# Patient Record
Sex: MALE | Race: WHITE
[De-identification: names, ages, dates, MRNs, and addresses within clinical notes are randomized per-mention and may not be internally consistent; named-entity substitution may affect disease eponyms.]

---

## 2017-06-07 ENCOUNTER — HOSPITAL ENCOUNTER (EMERGENCY)
Dept: HOSPITAL 11 - JP.ED | Age: 57
Discharge: HOME | End: 2017-06-07
Payer: COMMERCIAL

## 2017-06-07 VITALS — DIASTOLIC BLOOD PRESSURE: 99 MMHG | SYSTOLIC BLOOD PRESSURE: 168 MMHG

## 2017-06-07 DIAGNOSIS — E66.9: ICD-10-CM

## 2017-06-07 DIAGNOSIS — J18.9: Primary | ICD-10-CM

## 2017-06-07 DIAGNOSIS — Z90.81: ICD-10-CM

## 2017-06-07 DIAGNOSIS — Z87.891: ICD-10-CM

## 2017-06-07 PROCEDURE — 87040 BLOOD CULTURE FOR BACTERIA: CPT

## 2017-06-07 PROCEDURE — 85027 COMPLETE CBC AUTOMATED: CPT

## 2017-06-07 PROCEDURE — 93005 ELECTROCARDIOGRAM TRACING: CPT

## 2017-06-07 PROCEDURE — 71020: CPT

## 2017-06-07 PROCEDURE — 99285 EMERGENCY DEPT VISIT HI MDM: CPT

## 2017-06-07 PROCEDURE — 36415 COLL VENOUS BLD VENIPUNCTURE: CPT

## 2017-06-07 NOTE — EDM.PDOC
ED HPI GENERAL MEDICAL PROBLEM





- General


Chief Complaint: Chest Pain


Stated Complaint: NAUSEA/LIGHTHEADED/SHOULDER PAIN


Time Seen by Provider: 06/07/17 15:50


Source of Information: Reports: Patient, Old Records


History Limitations: Reports: No Limitations





- History of Present Illness


INITIAL COMMENTS - FREE TEXT/NARRATIVE: 





57 yo male with mild posterior chest/back pain today. Had chills this 

afternoon. Not aware of fever. Has no cardiac hx, but thought this might be his 

heart. No rash, cough, HA, nausea, diarrhea, abdominal pain, dysuria, sore 

throat or earache. Here with wife. Nocturia x one. 


Onset: Today


Onset Date: 06/07/17


Duration: Hour(s):, Getting Worse


Location: Reports: Chest, Back


Quality: Reports: Ache, Dull


Severity: Mild


Improves with: Reports: None


Worsens with: Reports: Other (time)


Context: Reports: Other (unknown)


Associated Symptoms: Reports: Fever/Chills, Shortness of Breath (very mild with 

exertion today)


Treatments PTA: Reports: Other (see below) (none)


  ** shoulders/ head


Pain Score (Numeric/FACES): 3





- Related Data


 Allergies











Allergy/AdvReac Type Severity Reaction Status Date / Time


 


No Known Allergies Allergy   Verified 03/01/16 06:50











Home Meds: 


 Home Meds





Azithromycin [IJD: Azithromycin] 250 mg PO QAM #6 tab 06/07/17 [Rx]











Past Medical History


HEENT History: Reports: Impaired Vision


Respiratory History: Reports: None


Genitourinary History: Reports: None


Other Genitourinary History: spleenectomy


Psychiatric History: Reports: None


Endocrine/Metabolic History: Reports: Obesity/BMI 30+


Hematologic History: Reports: None


Immunologic History: Reports: None


Oncologic (Cancer) History: Reports: None





- Infectious Disease History


Infectious Disease History: Reports: Chicken Pox





- Past Surgical History


Cardiovascular Surgical History: Reports: Other (See Below)


Other Cardiovascular Surgeries/Procedures: stress test, echo


GI Surgical History: Reports: Other (See Below)


Other GI Surgeries/Procedures: spleenectomy, liver lac patch


Musculoskeletal Surgical History: Reports: Arthroscopic Knee





Social & Family History





- Tobacco Use


Smoking Status *Q: Former Smoker


Years of Tobacco use: 29


Packs/Tins Daily: 1


Second Hand Smoke Exposure: No





- Alcohol Use


Days Per Week of Alcohol Use: 0





- Recreational Drug Use


Recreational Drug Use: No





ED ROS GENERAL





- Review of Systems


Review Of Systems: See Below


Constitutional: Reports: Chills.  Denies: Fever, Diaphoresis, Decreased Appetite


HEENT: Reports: No Symptoms


Respiratory: Reports: Shortness of Breath (today with exertion, mild)


Cardiovascular: Reports: Chest Pain (more in back)


Endocrine: Reports: No Symptoms


GI/Abdominal: Reports: No Symptoms


: Reports: No Symptoms


Musculoskeletal: Reports: Back Pain


Skin: Reports: No Symptoms


Neurological: Reports: No Symptoms





ED EXAM, GENERAL





- Physical Exam


Exam: See Below


Exam Limited By: No Limitations


General Appearance: Alert, WD/WN, No Apparent Distress


Eye Exam: Bilateral Eye: EOMI, Normal Inspection, PERRL


Ears: Normal External Exam, Normal Canal, Hearing Grossly Normal, Normal TMs


Ear Exam: Bilateral Ear: Auricle Normal, Canal Normal, TM normal


Nose: Normal Inspection, Normal Mucosa, No Blood


Throat/Mouth: Normal Inspection, Normal Lips, Normal Oropharynx, Normal Voice, 

No Airway Compromise


Head: Atraumatic, Normocephalic


Neck: Normal Inspection, Supple, Non-Tender


Respiratory/Chest: No Respiratory Distress, Lungs Clear, Normal Breath Sounds, 

No Accessory Muscle Use


Cardiovascular: Regular Rate, Rhythm, No Edema


GI/Abdominal: Normal Bowel Sounds, Soft, Non-Tender, No Distention, Other (

surgical scar from prior splenectomy).  No: Distended, Guarding, Rigid, Rebound

, Tender, Hernia, Mass


Back Exam: Normal Inspection, Full Range of Motion.  No: CVA Tenderness (R), 

CVA Tenderness (L), Decreased Range of Motion, Muscle Spasm, Paraspinal 

Tenderness, Vertebral Tenderness


Extremities: Normal Inspection, Normal Range of Motion, Non-Tender, No Pedal 

Edema


Neurological: Alert, Oriented, CN II-XII Intact, Normal Cognition, No Motor/

Sensory Deficits


Psychiatric: Normal Affect, Normal Mood


Skin Exam: Warm, Dry, Intact, Normal Color, No Rash


Lymphatic: No Adenopathy





EKG INTERPRETATION


EKG Date: 06/07/17


Time: 15:50


Rhythm: NSR


Rate (beats/min): 106


Axis: normal


P-wave: present


QRS: normal


ST-T: normal


QT: normal


Comparison: NA - no prior EKG





Course





- Vital Signs


Last Recorded V/S: 


 Last Vital Signs











Temp  38.8 C H  06/07/17 15:49


 


Pulse  108 H  06/07/17 15:49


 


Resp  18   06/07/17 15:49


 


BP  168/99 H  06/07/17 15:49


 


Pulse Ox  95   06/07/17 15:49














- Orders/Labs/Meds


Orders: 


 Active Orders 24 hr











 Category Date Time Status


 


 EKG Documentation Completion [RC] ASDIRECTED Care  06/07/17 15:51 Active


 


 Chest 2V [CR] Stat Exams  06/07/17 16:03 Taken


 


 CULTURE BLOOD [BC] Stat Lab  06/07/17 16:19 Ordered


 


 UA W/MICROSCOPIC [URIN] Stat Lab  06/07/17 16:05 Uncollected


 


 EKG 12 Lead [EK] Routine Ther  06/07/17 15:51 Ordered











Labs: 


 Laboratory Tests











  06/07/17 Range/Units





  16:09 


 


WBC  9.9  (4.5-11.0)  K/uL


 


RBC  5.32  (4.30-5.90)  M/uL


 


Hgb  15.5 H  (12.0-15.0)  g/dL


 


Hct  45.2  (40.0-54.0)  %


 


MCV  85  (80-98)  fL


 


MCH  29  (27-31)  pg


 


MCHC  34  (32-36)  %


 


Plt Count  282  (150-400)  K/uL











Meds: 


Medications














Discontinued Medications














Generic Name Dose Route Start Last Admin





  Trade Name Arnolq  PRN Reason Stop Dose Admin


 


Acetaminophen  1,000 mg  06/07/17 15:59  06/07/17 16:03





  Tylenol Extra Strength  PO  06/07/17 16:00  1,000 mg





  ONETIME ONE   Administration


 


Azithromycin  500 mg  06/07/17 16:20  





  Zithromax  PO  06/07/17 16:21  





  ONETIME ONE   














Departure





- Departure


Time of Disposition: 16:35


Disposition: Home, Self-Care 01


Condition: fair


Clinical Impression: 


Pneumonia


Qualifiers:


 Pneumonia type: due to unspecified organism Laterality: left Lung location: 

lower lobe of lung Qualified Code(s): J18.1 - Lobar pneumonia, unspecified 

organism





Prescriptions: 


Azithromycin [IJD: Azithromycin] 250 mg PO QAM #6 tab


Referrals: 


PCP,None [Primary Care Provider] - 


Forms:  ED Department Discharge, Return to Work/School Form


Additional Instructions: 


Take azithromycin as directed. Recheck in the clinic on Friday. Take 

acetaminophen 1000 mg every 6 hrs. Rest. Off work this week. 





- My Orders


Last 24 Hours: 


My Active Orders





06/07/17 15:51


EKG Documentation Completion [RC] ASDIRECTED 


EKG 12 Lead [EK] Routine 





06/07/17 16:03


Chest 2V [CR] Stat 





06/07/17 16:05


UA W/MICROSCOPIC [URIN] Stat 





06/07/17 16:19


CULTURE BLOOD [BC] Stat 














- Assessment/Plan


Last 24 Hours: 


My Active Orders





06/07/17 15:51


EKG Documentation Completion [RC] ASDIRECTED 


EKG 12 Lead [EK] Routine 





06/07/17 16:03


Chest 2V [CR] Stat 





06/07/17 16:05


UA W/MICROSCOPIC [URIN] Stat 





06/07/17 16:19


CULTURE BLOOD [BC] Stat

## 2017-06-09 ENCOUNTER — HOSPITAL ENCOUNTER (EMERGENCY)
Dept: HOSPITAL 11 - JP.ED | Age: 57
Discharge: HOME | End: 2017-06-09
Payer: COMMERCIAL

## 2017-06-09 VITALS — DIASTOLIC BLOOD PRESSURE: 66 MMHG | SYSTOLIC BLOOD PRESSURE: 95 MMHG

## 2017-06-09 DIAGNOSIS — J18.9: Primary | ICD-10-CM

## 2017-06-09 DIAGNOSIS — E66.9: ICD-10-CM

## 2017-06-09 DIAGNOSIS — F17.210: ICD-10-CM

## 2017-06-09 DIAGNOSIS — Z90.81: ICD-10-CM

## 2017-06-09 NOTE — EDM.PDOC
ED HPI GENERAL MEDICAL PROBLEM





- General


Chief Complaint: Respiratory Problem


Stated Complaint: PNEUMONIA, FEVER


Time Seen by Provider: 06/09/17 14:03


Source of Information: Reports: Patient, Family





- History of Present Illness


INITIAL COMMENTS - FREE TEXT/NARRATIVE: 





Seen in ER and diagnosed with pneumonia on Wednesday night.  Started Zpak and 

has take 3 doses.  Last had a temp today of 102.2.  Has been taking Tylenol or 

Motrin.  Pt does smoke but has not in the last several days.


Onset: Sudden


Onset Date: 06/13/17


Duration: Getting Worse, Intermittent


Location: Reports: Chest


Severity: Moderate


Improves with: Reports: Medication


Worsens with: Reports: None


Associated Symptoms: Reports: Fever/Chills, Loss of Appetite, Malaise, Nausea/

Vomiting, Shortness of Breath


  ** hips


Pain Score (Numeric/FACES): 4





- Related Data


 Allergies











Allergy/AdvReac Type Severity Reaction Status Date / Time


 


No Known Allergies Allergy   Verified 06/09/17 13:44











Home Meds: 


 Home Meds





Azithromycin [IJD: Azithromycin] 250 mg PO QAM #6 tab 06/07/17 [Rx]











Past Medical History


HEENT History: Reports: Impaired Vision


Respiratory History: Reports: None


Genitourinary History: Reports: None


Other Genitourinary History: spleenectomy


Psychiatric History: Reports: None


Endocrine/Metabolic History: Reports: Obesity/BMI 30+


Hematologic History: Reports: None


Immunologic History: Reports: None


Oncologic (Cancer) History: Reports: None





- Infectious Disease History


Infectious Disease History: Reports: Chicken Pox





- Past Surgical History


Cardiovascular Surgical History: Reports: Other (See Below)


Other Cardiovascular Surgeries/Procedures: stress test, echo


GI Surgical History: Reports: Other (See Below)


Other GI Surgeries/Procedures: spleenectomy, liver lac patch


Musculoskeletal Surgical History: Reports: Arthroscopic Knee





Social & Family History





- Tobacco Use


Smoking Status *Q: Current Some Day Smoker


Years of Tobacco use: 4


Packs/Tins Daily: 0.2


Used Tobacco, but Quit: No


Second Hand Smoke Exposure: No





- Caffeine Use


Caffeine Use: Reports: Coffee, Soda





- Alcohol Use


Days Per Week of Alcohol Use: 0





- Recreational Drug Use


Recreational Drug Use: No





ED ROS GENERAL





- Review of Systems


Review Of Systems: See Below


Constitutional: Reports: Fever, Chills, Malaise, Fatigue, Decreased Appetite


HEENT: Reports: No Symptoms


Respiratory: Reports: Cough


Cardiovascular: Reports: No Symptoms


GI/Abdominal: Reports: No Symptoms


Skin: Reports: No Symptoms


Neurological: Reports: No Symptoms





ED EXAM, GENERAL





- Physical Exam


Exam: See Below


Exam Limited By: No Limitations


General Appearance: Alert, WD/WN, No Apparent Distress


Ears: Normal External Exam, Normal Canal, Hearing Grossly Normal, Normal TMs


Ear Exam: Bilateral Ear: Auricle Normal, Canal Normal, TM normal


Nose: Normal Inspection, Normal Mucosa, No Blood


Throat/Mouth: Normal Inspection, Normal Lips, Normal Teeth, Normal Gums, Normal 

Oropharynx, Normal Voice, No Airway Compromise


Head: Atraumatic, Normocephalic


Neck: Normal Inspection, Supple, Non-Tender, Full Range of Motion


Respiratory/Chest: No Respiratory Distress, Lungs Clear, Normal Breath Sounds, 

No Accessory Muscle Use, Chest Non-Tender


Cardiovascular: Normal Peripheral Pulses, Regular Rate, Rhythm, No Edema, No 

Gallop, No JVD, No Murmur, No Rub





Course





- Vital Signs


Last Recorded V/S: 


 Last Vital Signs











Temp  99.9 F   06/09/17 13:51


 


Pulse  100   06/09/17 13:51


 


Resp  16   06/09/17 13:51


 


BP  95/66   06/09/17 13:51


 


Pulse Ox  93 L  06/09/17 13:51














- Orders/Labs/Meds


Labs: 


 Laboratory Tests











  06/09/17 Range/Units





  14:16 


 


WBC  6.7  (4.5-11.0)  K/uL


 


RBC  5.53  (4.30-5.90)  M/uL


 


Hgb  16.2 H  (12.0-15.0)  g/dL


 


Hct  46.1  (40.0-54.0)  %


 


MCV  83  (80-98)  fL


 


MCH  29  (27-31)  pg


 


MCHC  35  (32-36)  %


 


Plt Count  158  (150-400)  K/uL


 


Neut % (Auto)  93 H  (36-66)  %


 


Lymph % (Auto)  4 L  (24-44)  %


 


Mono % (Auto)  3  (2-6)  %


 


Eos % (Auto)  0 L  (2-4)  %


 


Baso % (Auto)  0  (0-1)  %














Departure





- Departure


Time of Disposition: 14:26


Disposition: Home, Self-Care 01


Condition: good


Clinical Impression: 


Pneumonia


Qualifiers:


 Pneumonia type: due to unspecified organism Laterality: left Lung location: 

lower lobe of lung Qualified Code(s): J18.1 - Lobar pneumonia, unspecified 

organism








- Discharge Information


Instructions:  Community-Acquired Pneumonia, Adult, Easy-to-Read


Forms:  ED Department Discharge


Additional Instructions: 


CBC improved from previous.  Reassured the pt and spouse that he is responding 

to antibiotic.  He is to continue full course of antibiotics.  Encouraged 

smoking cessation.  Increase fluids and rest.  No work until fever free for 24 

hours.





- Problem List & Annotations


(1) Pneumonia


SNOMED Code(s): 388634710


   Code(s): J18.9 - PNEUMONIA, UNSPECIFIED ORGANISM   Status: Acute   Priority: 

Low   Current Visit: No   


Qualifiers: 


   Pneumonia type: due to unspecified organism   Laterality: left   Lung 

location: lower lobe of lung   Qualified Code(s): J18.1 - Lobar pneumonia, 

unspecified organism   





- Problem List Review


Problem List Initiated/Reviewed/Updated: Yes

## 2018-02-12 ENCOUNTER — HOSPITAL ENCOUNTER (EMERGENCY)
Dept: HOSPITAL 11 - JP.ED | Age: 58
Discharge: HOME | End: 2018-02-12
Payer: COMMERCIAL

## 2018-02-12 VITALS — SYSTOLIC BLOOD PRESSURE: 163 MMHG | DIASTOLIC BLOOD PRESSURE: 108 MMHG

## 2018-02-12 DIAGNOSIS — E66.9: ICD-10-CM

## 2018-02-12 DIAGNOSIS — T24.212A: ICD-10-CM

## 2018-02-12 DIAGNOSIS — T24.292A: Primary | ICD-10-CM

## 2018-02-12 DIAGNOSIS — F17.210: ICD-10-CM

## 2018-02-12 PROCEDURE — 96375 TX/PRO/DX INJ NEW DRUG ADDON: CPT

## 2018-02-12 PROCEDURE — 96374 THER/PROPH/DIAG INJ IV PUSH: CPT

## 2018-02-12 PROCEDURE — 96376 TX/PRO/DX INJ SAME DRUG ADON: CPT

## 2018-02-12 PROCEDURE — 99283 EMERGENCY DEPT VISIT LOW MDM: CPT
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
## 2018-02-16 ENCOUNTER — HOSPITAL ENCOUNTER (OUTPATIENT)
Dept: HOSPITAL 11 - JP.SDS | Age: 58
Setting detail: OBSERVATION
Discharge: HOME | End: 2018-02-16
Attending: SURGERY | Admitting: SURGERY
Payer: COMMERCIAL

## 2018-02-16 VITALS — DIASTOLIC BLOOD PRESSURE: 56 MMHG | SYSTOLIC BLOOD PRESSURE: 121 MMHG

## 2018-02-16 DIAGNOSIS — T24.312A: Primary | ICD-10-CM
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
## 2018-02-16 PROCEDURE — 15002 WOUND PREP TRK/ARM/LEG: CPT

## 2018-02-16 PROCEDURE — 15003 WOUND PREP ADDL 100 CM: CPT

## 2018-02-16 PROCEDURE — 15100 SPLT AGRFT T/A/L 1ST 100SQCM: CPT

## 2018-02-16 PROCEDURE — 15101 SPLT AGRFT T/A/L EA ADDL 100: CPT

## 2018-02-16 PROCEDURE — C1762 CONN TISS, HUMAN(INC FASCIA): HCPCS
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
## 2018-02-19 ENCOUNTER — HOSPITAL ENCOUNTER (OUTPATIENT)
Dept: HOSPITAL 11 - JP.SDS | Age: 58
Discharge: HOME | End: 2018-02-19
Attending: SURGERY
Payer: COMMERCIAL

## 2018-02-19 VITALS — DIASTOLIC BLOOD PRESSURE: 68 MMHG | SYSTOLIC BLOOD PRESSURE: 131 MMHG

## 2018-02-19 DIAGNOSIS — F17.200: ICD-10-CM

## 2018-02-19 DIAGNOSIS — T24.302A: Primary | ICD-10-CM

## 2018-02-19 DIAGNOSIS — K21.9: ICD-10-CM

## 2018-02-19 PROCEDURE — 94640 AIRWAY INHALATION TREATMENT: CPT

## 2018-02-19 PROCEDURE — 15852 DRESSING CHANGE NOT FOR BURN: CPT
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
## 2018-02-23 ENCOUNTER — HOSPITAL ENCOUNTER (OUTPATIENT)
Dept: HOSPITAL 11 - JP.SDS | Age: 58
Discharge: HOME | End: 2018-02-23
Attending: SURGERY
Payer: COMMERCIAL

## 2018-02-23 VITALS — SYSTOLIC BLOOD PRESSURE: 124 MMHG | DIASTOLIC BLOOD PRESSURE: 85 MMHG

## 2018-02-23 DIAGNOSIS — T24.312A: Primary | ICD-10-CM

## 2018-02-23 DIAGNOSIS — F17.200: ICD-10-CM

## 2018-02-23 DIAGNOSIS — T31.0: ICD-10-CM

## 2018-02-23 PROCEDURE — 16025 DRESS/DEBRID P-THICK BURN M: CPT

## 2018-02-23 NOTE — OR
DATE OF PROCEDURE:  02/23/2018

 

PROCEDURES:

1. Dressing change under anesthesia.

2. Debridement, left leg superomedial aspect, full thickness, 2.1 x 1.3 cm.

 

COMPLICATIONS:  None.

 

ASSISTANT:  None.

 

ANESTHESIA:  MAC.

 

RISKS:  Risks, benefits, alternatives, and limitations including, but not limited to

infection, bleeding, and graft failure were explained to the patient, they wished to

proceed.

 

FINDINGS:

1. 100% graft take at this time.

2. No other gross abnormalities.

 

PROCEDURE IN DETAIL:  The patient was placed in a prone position.  After the dressings were

carefully removed by soaking with normal saline, these were carefully removed.  The graft

was noted to be 100% took.  There was a full-thickness wound as described in the dimensions

above.  This was between the medial, superior, and inferolateral grafts.  This was such a

small piece that would not be amenable to grafting, but requires debridement and will heal

by secondary intention.

 

There was no evidence of infection.  No significant drainage.

 

The dressings were changed in the same fashion with Telfa with bacitracin-coated Xeroform,

then followed by Mepitel Ones, then followed by Mepilex, then followed by Kerlix, then

followed by Ace with the brace.  The patient tolerated the procedure well.

 

 

 

 

Galen Conley MD

DD:  02/23/2018 08:26:21

DT:  02/23/2018 10:19:53

Job #:  [id number]

## 2018-03-12 NOTE — OR
DATE OF PROCEDURE:  02/23/2018

 

ADDENDUM:

Depth of debridement into the dermis approximately 9 mm.

 

 

 

 

Galen Conley MD

DD:  03/11/2018 09:18:07

DT:  03/11/2018 14:41:50

Job #:  [id number]
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
## 2018-03-22 ENCOUNTER — HOSPITAL ENCOUNTER (OUTPATIENT)
Dept: HOSPITAL 11 - JP.SDS | Age: 58
Discharge: HOME | End: 2018-03-22
Attending: SURGERY
Payer: COMMERCIAL

## 2018-03-22 VITALS — SYSTOLIC BLOOD PRESSURE: 142 MMHG | DIASTOLIC BLOOD PRESSURE: 92 MMHG

## 2018-03-22 DIAGNOSIS — R73.9: ICD-10-CM

## 2018-03-22 DIAGNOSIS — Z90.81: ICD-10-CM

## 2018-03-22 DIAGNOSIS — T24.302A: Primary | ICD-10-CM

## 2018-03-22 DIAGNOSIS — Z87.891: ICD-10-CM

## 2018-03-22 DIAGNOSIS — E78.5: ICD-10-CM

## 2018-03-22 PROCEDURE — 36415 COLL VENOUS BLD VENIPUNCTURE: CPT

## 2018-03-22 PROCEDURE — C1762 CONN TISS, HUMAN(INC FASCIA): HCPCS

## 2018-03-22 PROCEDURE — 15002 WOUND PREP TRK/ARM/LEG: CPT

## 2018-03-22 PROCEDURE — 15100 SPLT AGRFT T/A/L 1ST 100SQCM: CPT

## 2018-03-22 PROCEDURE — 85027 COMPLETE CBC AUTOMATED: CPT

## 2018-03-22 PROCEDURE — 80053 COMPREHEN METABOLIC PANEL: CPT

## 2018-03-22 RX ADMIN — Medication ONE ML: at 12:06

## 2018-03-22 RX ADMIN — Medication ONE ML: at 12:45

## 2018-03-22 RX ADMIN — SODIUM CHLORIDE ONE ML: 0.9 INJECTION, SOLUTION INTRAVENOUS at 12:45

## 2018-03-22 RX ADMIN — SODIUM CHLORIDE ONE ML: 0.9 INJECTION, SOLUTION INTRAVENOUS at 12:06
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
